# Patient Record
Sex: MALE | Race: WHITE | Employment: FULL TIME | URBAN - METROPOLITAN AREA
[De-identification: names, ages, dates, MRNs, and addresses within clinical notes are randomized per-mention and may not be internally consistent; named-entity substitution may affect disease eponyms.]

---

## 2020-11-16 ENCOUNTER — VIRTUAL VISIT (OUTPATIENT)
Dept: FAMILY MEDICINE | Facility: OTHER | Age: 50
End: 2020-11-16

## 2020-11-16 ENCOUNTER — AMBULATORY - HEALTHEAST (OUTPATIENT)
Dept: FAMILY MEDICINE | Facility: CLINIC | Age: 50
End: 2020-11-16

## 2020-11-16 DIAGNOSIS — Z20.822 SUSPECTED 2019 NOVEL CORONAVIRUS INFECTION: ICD-10-CM

## 2020-11-16 NOTE — PROGRESS NOTES
"Date: 2020 11:01:13  Clinician: Pasquale Vale  Clinician NPI: [id number]  Patient: Donavon Talavera  Patient : 1970  Patient Address: [address] [place] [address]  Patient Phone: [phone]  Visit Protocol: URI  Patient Summary:  Donavon is a 49 year old ( : 1970 ) male who initiated a [facility] Visit for COVID-19 (Coronavirus) evaluation and screening. When asked the question \"Please sign me up to receive news, health information and promotions. \", Donavon responded \"No\".    Donavon states his symptoms started gradually 7-9 days ago.   His symptoms consist of facial pain or pressure, ear pain, and a headache.   Symptom details     Facial pain or pressure: The facial pain or pressure does not feel worse when bending or leaning forward.     Headache: He states the headache is mild (1-3 on a 10 point pain scale).      Donavon denies having vomiting, rhinitis, myalgias, chills, malaise, sore throat, teeth pain, ageusia, diarrhea, wheezing, fever, cough, nasal congestion, nausea, and anosmia. He also denies taking antibiotic medication in the past month, having recent facial or sinus surgery in the past 60 days, and double sickening (worsening symptoms after initial improvement). He is not experiencing dyspnea.    Pertinent COVID-19 (Coronavirus) information  Donavon does not work or volunteer as healthcare worker or a . In the past 14 days, Donavon has not worked or volunteered at a healthcare facility or group living setting.   In the past 14 days, he also has not lived in a congregate living setting.   Donavon has had a close contact with a laboratory-confirmed COVID-19 patient within 14 days of symptom onset. He was not exposed at his work. Date Donavon was exposed to the laboratory-confirmed COVID-19 patient: 2020   Additional information about contact with COVID-19 (Coronavirus) patient as reported by the patient (free text): Just found out today moises has covid    Since " December 2019, Donavon has been tested for COVID-19 and has not had upper respiratory infection or influenza-like illness.      Result of COVID-19 test: Negative     Date of his COVID-19 test: 11/05/2020      Pertinent medical history  Donavon does not need a return to work/school note.   Weight: 218 lbs   Donavon does not smoke or use smokeless tobacco.   Weight: 218 lbs    MEDICATIONS: lisinopril oral, ALLERGIES: NKDA  Clinician Response:  Dear Donavon,   Your symptoms show that you may have coronavirus (COVID-19). This illness can cause fever, cough and trouble breathing. Many people get a mild case and get better on their own. Some people can get very sick.  What should I do?  We would like to test you for this virus.   1. Please call [phone] to schedule your visit. Explain that you were referred by [facility] to have a COVID-19 test. Be ready to share your [facility] visit ID number.  * If you need to schedule in [facility] please call [phone] or for [place] employees please call [phone].  * If you need to schedule in the [place] area please call [phone]. [place] employees call [phone].  The following will serve as your written order for this COVID Test, ordered by me, for the indication of suspected COVID [Z20.828]: The test will be ordered in [facility], our electronic health record, after you are scheduled. It will show as ordered and authorized by Trav Cramer MD.  Order: COVID-19 (Coronavirus) PCR for SYMPTOMATIC testing from [facility].   2. When it's time for your COVID test:  Stay at least 6 feet away from others. (If someone will drive you to your test, stay in the backseat, as far away from the  as you can.)   Cover your mouth and nose with a mask, tissue or washcloth.  Go straight to the testing site. Don't make any stops on the way there or back.      3.Starting now: Stay home and away from others (self-isolate) until:   You've had no fever---and no medicine that reduces fever---for one  "full day (24 hours). And...   Your other symptoms have gotten better. For example, your cough or breathing has improved. And...   At least 10 days have passed since your symptoms started.       During this time, don't leave the house except for testing or medical care.   Stay in your own room, even for meals. Use your own bathroom if you can.   Stay away from others in your home. No hugging, kissing or shaking hands. No visitors.  Don't go to work, school or anywhere else.    Clean \"high touch\" surfaces often (doorknobs, counters, handles, etc.). Use a household cleaning spray or wipes. You'll find a full list of  on the EPA website: www.epa.gov/pesticide-registration/list-n-disinfectants-use-against-sars-cov-2.   Cover your mouth and nose with a mask, tissue or washcloth to avoid spreading germs.  Wash your hands and face often. Use soap and water.  Caregivers in these groups are at risk for severe illness due to COVID-19:  o People 65 years and older  o People who live in a nursing home or long-term care facility  o People with chronic disease (lung, heart, cancer, diabetes, kidney, liver, immunologic)  o People who have a weakened immune system, including those who:   Are in cancer treatment  Take medicine that weakens the immune system, such as corticosteroids  Had a bone marrow or organ transplant  Have an immune deficiency  Have poorly controlled HIV or AIDS  Are obese (body mass index of 40 or higher)  Smoke regularly   o Caregivers should wear gloves while washing dishes, handling laundry and cleaning bedrooms and bathrooms.  o Use caution when washing and drying laundry: Don't shake dirty laundry, and use the warmest water setting that you can.  o For more tips, go to www.cdc.gov/coronavirus/2019-ncov/downloads/10Things.pdf.       How can I take care of myself?   Get lots of rest. Drink extra fluids (unless a doctor has told you not to).   Take Tylenol (acetaminophen) for fever or pain. If you have " liver or kidney problems, ask your family doctor if it's okay to take Tylenol.   Adults can take either:    650 mg (two 325 mg pills) every 4 to 6 hours, or...   1,000 mg (two 500 mg pills) every 8 hours as needed.    Note: Don't take more than 3,000 mg in one day. Acetaminophen is found in many medicines (both prescribed and over-the-counter medicines). Read all labels to be sure you don't take too much.   For children, check the Tylenol bottle for the right dose. The dose is based on the child's age or weight.    If you have other health problems (like cancer, heart failure, an organ transplant or severe kidney disease): Call your specialty clinic if you don't feel better in the next 2 days.       Know when to call 911. Emergency warning signs include:    Trouble breathing or shortness of breath Pain or pressure in the chest that doesn't go away Feeling confused like you haven't felt before, or not being able to wake up Bluish-colored lips or face.  Where can I get more information?   [facility] -- About COVID-19: [email or web address]   CDC -- What to Do If You're Sick: www.cdc.gov/coronavirus/2019-ncov/about/steps-when-sick.html   CDC -- Ending Home Isolation: www.cdc.gov/coronavirus/2019-ncov/hcp/disposition-in-home-patients.html   CDC -- Caring for Someone: www.cdc.gov/coronavirus/2019-ncov/if-you-are-sick/care-for-someone.html   [facility] -- Interim Guidance for Hospital Discharge to Home: www.health.[facility].[place].us/diseases/coronavirus/hcp/hospdischarge.pdf   [facility] clinical trials (COVID-19 research studies): [email or web address]    Below are the COVID-19 hotlines at the [place] Department of Health ([facility]). Interpreters are available.    For health questions: Call 371-444-5708 or 1-110.807.3389 (7 a.m. to 7 p.m.) For questions about schools and childcare: Call 978-440-2818 or 1-296.461.8027 (7 a.m. to 7 p.m.)    Diagnosis: Contact with and (suspected)  exposure to other viral communicable diseases  Diagnosis ICD: Z20.828

## 2020-11-17 ENCOUNTER — AMBULATORY - HEALTHEAST (OUTPATIENT)
Dept: FAMILY MEDICINE | Facility: CLINIC | Age: 50
End: 2020-11-17

## 2020-11-17 DIAGNOSIS — Z20.822 SUSPECTED 2019 NOVEL CORONAVIRUS INFECTION: ICD-10-CM

## 2020-11-19 ENCOUNTER — COMMUNICATION - HEALTHEAST (OUTPATIENT)
Dept: SCHEDULING | Facility: CLINIC | Age: 50
End: 2020-11-19

## 2021-02-08 ENCOUNTER — THERAPY VISIT (OUTPATIENT)
Dept: PHYSICAL THERAPY | Facility: CLINIC | Age: 51
End: 2021-02-08
Payer: COMMERCIAL

## 2021-02-08 DIAGNOSIS — M54.6 RIGHT-SIDED THORACIC BACK PAIN: Primary | ICD-10-CM

## 2021-02-08 PROCEDURE — 97161 PT EVAL LOW COMPLEX 20 MIN: CPT | Mod: GP | Performed by: PHYSICAL THERAPIST

## 2021-02-08 PROCEDURE — 97110 THERAPEUTIC EXERCISES: CPT | Mod: GP | Performed by: PHYSICAL THERAPIST

## 2021-02-08 PROCEDURE — 97530 THERAPEUTIC ACTIVITIES: CPT | Mod: GP | Performed by: PHYSICAL THERAPIST

## 2021-02-08 NOTE — PROGRESS NOTES
[facility] Physical Therapy Initial Evaluation  2/8/2021     Precautions/Restrictions/MD instructions: eval & treat    Therapist Assessment: Donavon Talavera is a 50 year old male patient presenting to Physical Therapy with R scapular pain. Patient demonstrates decreased ROM, decreased joint mobility, and poor scapular motor control. Signs and symptoms are consistent with scapular pain with thoracic movement deficits. These impairments limit their ability to sit or stand still for extended periods. Skilled PT services are necessary in order to reduce impairments and improve independent function.    Subjective History    Injury/Condition Details:  Presenting Complaint Right shoulder blade pain   Onset Timing/Date 1.5 months, (Doctor's referral 2/5/21)   Mechanism Unknown; has had this pain before in the past, but it went away after a couple of days     Symptom Behavior Details    Primary Symptoms Constant symptoms; worsen with activity, pain (Location: R scapula, Quality: pinching/pulling)    Denies locking, catching, giving way, or instability. Denies numbness, tingling, changes in sensation. Reports occasional pain shooting down R tricep and into axilla   Worst Pain 9-10/10 (constant until he started steroids)   Symptom Provocators Sitting to drive   Best Pain 3/10    Symptom Relievers Laying flat on his back   Time of day dependent? No   Recent symptom change? symptoms improving     Prior Testing/Intervention for current condition:  Prior Tests  MRI 1/27/20  IMPRESSION:    1.  Small right-sided disc herniation at T2-T3. Central canal and neural foramen appear adequate, but the herniation could potentially affect the exiting nerve given the history of right-sided thoracic back pain. Correlate for any right T2 symptoms.    2.  Mild to moderate disc degeneration in the midthoracic spine without high-grade stenosis.    3.  No thoracic spine edema. Normal thoracic spinal cord.    Prior Treatment  Medication; steroid (took the edge off) and NSAIDs, chiropractor (helped in the past)     Lifestyle & General Medical History:  Employment  - 6 hr shifts, walking, stair climbing, standing, usually 30-45 min break   Usual physical activities  (within past year) Snowblowing/shoveling, walking the dogs min 1mile   Orthopaedic History  Lipoma removal   Medication  High blood pressure, statin   Notable medical history See Epic Chart   Patient goals Get rid of pain   Patient Reported Health good     Red Flags: (Bold when present) - reviewed the following and denies  Vertebrobasilar Artery   Symptom   Dysphagia Drop Attack   Dysarthria Dizziness   Diplopia Paresthesia of lips   Spinal Cord  Symptom   Bi/Quadriparesthesia Ataxia   Hemiparesthesia Urinary incontinence   Bi/Quadriparesis Fecal incontinence     Cranial Nerves - bold when abnormality is present  Cranial nerve   II-Scotoma VIII-Loss of Balance   III-Diplopia VIII-Hypoacousia   V-Facial paresthesia IX-Dysarthria   VII-Altered Taste IX-Dysphagia    X-Nausea       PHYSICAL THERAPY CERVICAL EXAMINATION    STATIC POSTURE  Forward head on neck, Increased thoracic kyphosis and Rounded shoulders    Cervical Spine ROM Screen   RIGHT LEFT   Cervical Spine ROM   Flexion Full - painfree   Extension Full - painfree   Rotation Min limit - painful Min limit - painfree   Sidebend Min limit - painfree Min limit - painfree   Seated Thoracic Spine ROM   Rotation Min limit - some pain Min limit - painfree   Flexion Full - decreases pain    Extension Min limit - decreases pain      Passive Joint Mobility Screen: hypomobile T3-T6    Tender to palpation at the following structures: SP C7, T2-T6  NOT tender to palpation at the following structures: R periscapular region       Upper Extremity Neural System Examination  Myotomes Strength (MMT)    Left Pain Right Pain   Resisted ABD (C5) 5/5 - 5/5 -   Resisted Elbow Flexion (C6)                 Wrist Extension 5/5 -  - 5/5  -  -   Resisted Elbow Extension (C7)                 Wrist Flexion 5/5   -   5/5   -     Resisted Thumb Extension (C8) 5/5 - 5/5 -   Resisted Intrinsics (T1) 5/5 - 5/5 -   Sensory/Reflex Tests: SILT and symmetrical for bilateral UE's.    Scapular Muscle Strength  Muscle MMT L MMT R   Lats 4+/5 4+/5   Mid Traps 4+/5 4/5   Rhomboids 4+/5 4/5   Lower Traps 4/5 4/5     When asked to engage scapulae in prone, pt activated lats - was unable to engage scapular adductors/depressors without verbal and tactile cueing    Upper Extremity Flexibility Screen:   Right Left   Pec Major - -   Pec Minor - -   Upper Trap + +   Levator Scapula - -   Scalenes - -   SubOccipital - -   Erector Spinae  + +   - = normal  + = mild tightness  ++ = moderate tightness  +++ = significant tightness      ASSESSMENT/PLAN:  Pt's pain increased with forced scapular abduction (arms across chest) and decreased with active thoracic flexion and extension. I was unable to elicit pt's complaint of shooting pain in the R tricep and R axilla. Pt emphasizes that location and severity of pain changes day to day and is seemingly random.    The patient is a 50 year old male with chief complaint of mid-back pain.    The patient has the following significant findings with corresponding treatment plan.  Diagnosis 1:  Acute on chronic mid-back pain    Pain -  hot/cold therapy, US, electric stimulation, manual therapy, STS, self management, education, directional preference exercise and home program  Decreased ROM/flexibility - manual therapy, therapeutic exercise and home program  Decreased joint mobility - manual therapy, therapeutic exercise and home program  Decreased strength - therapeutic exercise, therapeutic activities and home program  Impaired balance - neuro re-education, therapeutic activities and home program  Decreased proprioception - neuro re-education and therapeutic activities  Impaired gait - gait training and assistive devices  Impaired muscle  performance - neuro re-education and home program  Decreased function - therapeutic activities and home program  Impaired posture - neuro re-education, therapeutic activities and home program      Therapy Evaluation Codes:   1) History comprised of:   Personal factors that impact the plan of care:      None.    Comorbidity factors that impact the plan of care are:      High blood pressure.     Medications impacting care: High blood pressure.  2) Examination of Body Systems comprised of:   Body structures and functions that impact the plan of care:      Thoracic Spine.   Activity limitations that impact the plan of care are:      Sitting, Standing and Working.  3) Clinical presentation characteristics are:   Evolving/Changing.  4) Decision-Making    Moderate complexity using standardized patient assessment instrument and/or measureable assessment of functional outcome.  Cumulative Therapy Evaluation is: Low complexity.    Previous and current functional limitations:  (See Goal Flow Sheet for this information)    Short term and Long term goals: (See Goal Flow Sheet for this information)     Communication ability:  Patient appears to be able to clearly communicate and understand verbal and written communication and follow directions correctly.  Treatment Explanation - The following has been discussed with the patient:   RX ordered/plan of care  Anticipated outcomes  Possible risks and side effects  This patient would benefit from PT intervention to resume normal activities.   Rehab potential is good.    Frequency:  1 X week, once daily  Duration:  for 6 weeks  Discharge Plan: Achieve all LTGs, be independent in home treatment program, and reach maximal therapeutic benefit.    Please refer to the daily flowsheet for treatment today, total treatment time and time spent performing 1:1 timed codes.

## 2021-02-18 ENCOUNTER — THERAPY VISIT (OUTPATIENT)
Dept: PHYSICAL THERAPY | Facility: CLINIC | Age: 51
End: 2021-02-18
Payer: COMMERCIAL

## 2021-02-18 DIAGNOSIS — M54.6 RIGHT-SIDED THORACIC BACK PAIN: ICD-10-CM

## 2021-02-18 PROCEDURE — 97110 THERAPEUTIC EXERCISES: CPT | Mod: GP | Performed by: PHYSICAL THERAPIST

## 2021-02-18 PROCEDURE — 97530 THERAPEUTIC ACTIVITIES: CPT | Mod: GP | Performed by: PHYSICAL THERAPIST

## 2021-02-18 PROCEDURE — 97140 MANUAL THERAPY 1/> REGIONS: CPT | Mod: GP | Performed by: PHYSICAL THERAPIST

## 2021-02-25 ENCOUNTER — THERAPY VISIT (OUTPATIENT)
Dept: PHYSICAL THERAPY | Facility: CLINIC | Age: 51
End: 2021-02-25
Payer: COMMERCIAL

## 2021-02-25 DIAGNOSIS — M54.6 RIGHT-SIDED THORACIC BACK PAIN: ICD-10-CM

## 2021-02-25 PROCEDURE — 97110 THERAPEUTIC EXERCISES: CPT | Mod: GP | Performed by: PHYSICAL THERAPIST

## 2021-02-25 PROCEDURE — 97140 MANUAL THERAPY 1/> REGIONS: CPT | Mod: GP | Performed by: PHYSICAL THERAPIST

## 2021-03-04 ENCOUNTER — THERAPY VISIT (OUTPATIENT)
Dept: PHYSICAL THERAPY | Facility: CLINIC | Age: 51
End: 2021-03-04
Payer: COMMERCIAL

## 2021-03-04 DIAGNOSIS — M54.6 RIGHT-SIDED THORACIC BACK PAIN: ICD-10-CM

## 2021-03-04 PROCEDURE — 97110 THERAPEUTIC EXERCISES: CPT | Mod: GP | Performed by: PHYSICAL THERAPIST

## 2021-03-04 PROCEDURE — 97140 MANUAL THERAPY 1/> REGIONS: CPT | Mod: GP | Performed by: PHYSICAL THERAPIST

## 2021-03-11 ENCOUNTER — THERAPY VISIT (OUTPATIENT)
Dept: PHYSICAL THERAPY | Facility: CLINIC | Age: 51
End: 2021-03-11
Payer: COMMERCIAL

## 2021-03-11 DIAGNOSIS — M54.6 RIGHT-SIDED THORACIC BACK PAIN: ICD-10-CM

## 2021-03-11 PROCEDURE — 97140 MANUAL THERAPY 1/> REGIONS: CPT | Mod: GP | Performed by: PHYSICAL THERAPIST

## 2021-03-11 PROCEDURE — 97110 THERAPEUTIC EXERCISES: CPT | Mod: GP | Performed by: PHYSICAL THERAPIST

## 2021-03-18 ENCOUNTER — THERAPY VISIT (OUTPATIENT)
Dept: PHYSICAL THERAPY | Facility: CLINIC | Age: 51
End: 2021-03-18
Payer: COMMERCIAL

## 2021-03-18 DIAGNOSIS — M54.6 RIGHT-SIDED THORACIC BACK PAIN: ICD-10-CM

## 2021-03-18 PROCEDURE — 97112 NEUROMUSCULAR REEDUCATION: CPT | Mod: GP | Performed by: PHYSICAL THERAPIST

## 2021-03-18 PROCEDURE — 97140 MANUAL THERAPY 1/> REGIONS: CPT | Mod: GP | Performed by: PHYSICAL THERAPIST

## 2021-03-18 NOTE — LETTER
"[facility] [place]  [address]  [address]  [place] [address]  [phone]    2021    Re: Donavon Talavera   :   1970  MRN:  [id number]   REFERRING PHYSICIAN:   Juwan LARES [facility] [place]    Date of Initial Evaluation:  2021  Visits:  Rxs Used: 6  Reason for Referral:  Right-sided thoracic back pain    EVALUATION SUMMARY    PROGRESS  REPORT    Progress reporting period is from 2021 to 3/18/2021.       SUBJECTIVE  Subjective: Over the last 2-3 days things have been pretty bad. Pain between 2-4/10. He has worked every day since last Friday - the majority of work has been repetitive motions overhead. 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 \"the pain always starts at work.\"     Current Pain level: 1/10.     Initial Pain level: 4/10.   Changes in function:  Yes (See Goal flowsheet attached for changes in current functional level)  Adverse reaction to treatment or activity: activity - repetitive OH use of arm at work triggers symptoms due to poor scapular mechanics    OBJECTIVE  Objective: poor scapular mechanics with OH lifting task - compensates with shoulder shrug. 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)     ASSESSMENT/PLAN  Updated problem list and treatment plan: Diagnosis 1:  Acute on chronic mid back pain  Pain -  hot/cold therapy, US, electric stimulation, manual therapy, splint/taping/bracing/orthotics, self management, education and home program  Decreased ROM/flexibility - manual therapy, therapeutic exercise and home program  Decreased strength - therapeutic exercise, therapeutic activities and home program  Decreased proprioception - neuro re-education, therapeutic activities and home program  Impaired muscle performance - neuro re-education and home program  Decreased " function - therapeutic activities and home program  Impaired posture - neuro re-education and home program  STG/LTGs have been met or progress has been made towards goals:  Yes (See Goal flow sheet completed today.)  Assessment of Progress: The patient's condition has potential to improve.  Self Management Plans:  Patient is independent in a home treatment program.  Patient  has been instructed in self management of symptoms.  I have re-evaluated this patient and find that the nature, scope, duration and intensity of the therapy is appropriate for the medical condition of the patient.  Donavon continues to require the following intervention to meet STG and LTG's:  PT    Recommendations:  This patient would benefit from continued therapy.     Frequency:  1 X week, once daily  Duration:  for 2 weeks tapering to 2 X a month over 2 months              Thank you for your referral.    INQUIRIES  Therapist: Britt Huang PT   [address]  [facility] [address]  Phone: [phone]  Fax: [phone]

## 2021-03-18 NOTE — PROGRESS NOTES
"PROGRESS  REPORT    Progress reporting period is from 2/8/2021 to 3/18/2021.       SUBJECTIVE  Subjective: Over the last 2-3 days things have been pretty bad. Pain between 2-4/10. He has worked every day since last Friday - the majority of work has been repetitive motions overhead. 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 \"the pain always starts at work.\"     Current Pain level: 1/10.     Initial Pain level: 4/10.   Changes in function:  Yes (See Goal flowsheet attached for changes in current functional level)  Adverse reaction to treatment or activity: activity - repetitive OH use of arm at work triggers symptoms due to poor scapular mechanics    OBJECTIVE  Objective: poor scapular mechanics with OH lifting task - compensates with shoulder shrug. 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)     ASSESSMENT/PLAN  Updated problem list and treatment plan: Diagnosis 1:  Acute on chronic mid back pain  Pain -  hot/cold therapy, US, electric stimulation, manual therapy, splint/taping/bracing/orthotics, self management, education and home program  Decreased ROM/flexibility - manual therapy, therapeutic exercise and home program  Decreased strength - therapeutic exercise, therapeutic activities and home program  Decreased proprioception - neuro re-education, therapeutic activities and home program  Impaired muscle performance - neuro re-education and home program  Decreased function - therapeutic activities and home program  Impaired posture - neuro re-education and home program  STG/LTGs have been met or progress has been made towards goals:  Yes (See Goal flow sheet completed today.)  Assessment of Progress: The patient's condition has potential to improve.  Self Management Plans:  Patient is independent in a home treatment program.  Patient  has been instructed in self management of symptoms.  I have " re-evaluated this patient and find that the nature, scope, duration and intensity of the therapy is appropriate for the medical condition of the patient.  Donavon continues to require the following intervention to meet STG and LTG's:  PT    Recommendations:  This patient would benefit from continued therapy.     Frequency:  1 X week, once daily  Duration:  for 2 weeks tapering to 2 X a month over 2 months        Please refer to the daily flowsheet for treatment today, total treatment time and time spent performing 1:1 timed codes.

## 2021-06-03 ENCOUNTER — THERAPY VISIT (OUTPATIENT)
Dept: PHYSICAL THERAPY | Facility: CLINIC | Age: 51
End: 2021-06-03
Payer: COMMERCIAL

## 2021-06-03 DIAGNOSIS — M25.512 LEFT SHOULDER PAIN, UNSPECIFIED CHRONICITY: Primary | ICD-10-CM

## 2021-06-03 DIAGNOSIS — Z86.018 S/P EXCISION OF LIPOMA: ICD-10-CM

## 2021-06-03 DIAGNOSIS — M54.2 NECK PAIN: ICD-10-CM

## 2021-06-03 DIAGNOSIS — Z98.890 S/P EXCISION OF LIPOMA: ICD-10-CM

## 2021-06-03 PROCEDURE — 97161 PT EVAL LOW COMPLEX 20 MIN: CPT | Mod: GP | Performed by: PHYSICAL THERAPIST

## 2021-06-03 PROCEDURE — 97110 THERAPEUTIC EXERCISES: CPT | Mod: GP | Performed by: PHYSICAL THERAPIST

## 2021-06-03 NOTE — LETTER
[facility] [place]  [address] [facility]  [address]  [place] [address]  [phone]    2021    Re: Donavon Talavera   :   1970  MRN:  [id number]   REFERRING PHYSICIAN:   Alyssa LARES [facility] [place]    Date of Initial Evaluation:  6/3/2021  Visits:  Rxs Used: 1  Reason for Referral:     Left shoulder pain, unspecified chronicity  Neck pain  S/P excision of lipoma    EVALUATION SUMMARY     PHYSICAL THERAPY INITIAL EVALUATION    Precautions/Restrictions/MD instructions:      Therapist Assessment: Donavon Talavera is a 50 year old right hand dominant male who presents to Physical Therapy following surgical excision of a large left sided neck mass.  Patient demonstrates mild loss of cervical AROM, decreased left shoulder AROM, significant scapular dyskinesis, and left shoulder weakness.  Signs and symptoms are consistent with post-op status.   These impairments limit his ability to perform ADLs with left UE.  Skilled PT services are necessary in order to reduce impairments and improve independent function.     SUBJECTIVE:  Chief Complaint: decreased mobility and strength in left shoulder   MD referral date:  21     DOS: 21 had very large mass excised from the left side of his neck. Twenty years ago, had a lipoma removed from this same area.      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  Spinal accessory nerve running through mass--dissected it out but nerve was without twitches at end of case  Mechanism of Injury: none  New/Recurrent/Chronic:  recurrent  Pain Location: left side of neck, collar bone, and lateral border of scapula;  described as tightness and rated as 1/10  Associated Symptoms: motions loss, weakness; denies any UE paresthesias  Exacerbated by:  Unknown; has not been  using left arm much               Alleviated by: activity modification  Time of day: no effect  Progression of symptoms since onset:  Gradually improving  Previous Treatment:  none  General Health as reported by patient: Good  Pertinent Medical History: high blood pressure; cervical spine herniated disc  Surgical History: lipoma excision 30 yrs ago  Imaging: MRI, CT scan, X-rays, US  Medications:  High blood pressure medication, cholesterol medication  Occupation:   Primary Job Tasks: operating a machine, pushing/pulling, prolonged standing    Restrictions: currently not working due to present condition; plans to return 6/0 without restrictions  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  Red Flags:  patient denies any fever, chills, or night sweats; recent infection    Current Functional Status: impaired  Previous Functional Status:  fully functional  Leisure Activities: none  Patient's Goal(s):  To regain full motion and strength in left shoulder            Objective:  System  SHOULDER EVALUATION  POSTURE: downwardly rotated L scap with anterior tilt; scapular winging   CERVICAL SCREEN: Flex WNL, Ext WNL, Rotation R mild loss with end range tightness, Rotation L WNL, SB mild loss bilat with end range tightness  SPURLING'S: NT  RANGE OF MOTION: (* denotes pain)   AROM L AROM R PROM L PROM R   FLEXION 115* 165 WNL    ABDUCTION 118* 173 WNL*    IR/EXT T7 with severe scapular winging T8     IR   WNL    ER WNL  WNL    HORIZ ADD       STRENGTH:  (* denotes pain)   LEFT RIGHT   FLEXION  3-/5      5/5   ABDUCTION 3-/5 5/5   IR 5/5 5/5   ER 5/5 5/5   SUPRASPINATUS     ELBOW FLEX 5/5 5/5   ELBOW EXT 5/5 5/5   Scapulohumeral Rhythm: Abnormal with significant scapular dyskinesis     Other:  Active shoulder elevation, depression, protraction, and retraction WNL  Mobility Testing: NT         Palpation:   moderate swelling and decreased sensation in left supraclavicular fossa; incisional scar healing and  unremarkable for any signs of infection or drainage; no tenderness to palpation reported over left UT, levator, scalenes, SCM, cervical paraspinals or posterior cuff musculature    Assessment/Plan:    Patient is a 50 year old male with left shoulder complaints.    Patient has the following significant findings with corresponding treatment plan.                Diagnosis 1:  Left Shoulder Pain    Pain -  self management, education and home program  Decreased ROM/flexibility - therapeutic exercise  Decreased strength - therapeutic exercise  Impaired muscle performance - neuro re-education  Decreased function - therapeutic activities and home program  Impaired posture - neuro re-education  Cumulative Therapy Evaluation is: Low complexity.  Previous and current functional limitations:  (See Goal Flow Sheet for this information)    Short term and Long term goals: (See Goal Flow Sheet for this information)   Communication ability:  Patient appears to be able to clearly communicate and understand verbal and written communication and follow directions correctly.  Treatment Explanation - The following has been discussed with the patient:   RX ordered/plan of care  Anticipated outcomes  Possible risks and side effects  This patient would benefit from PT intervention to resume normal activities.   Rehab potential is good.  Frequency:  1 X week, once daily  Duration:  for 6 weeks  Discharge Plan:  Achieve all LTG.  Independent in home treatment program.  Reach maximal therapeutic benefit.          Thank you for your referral.    INQUIRIES  Therapist: Ruthy Motley PT   [address] [address]  Phone: [phone]  Fax: [phone]

## 2021-06-03 NOTE — PROGRESS NOTES
PHYSICAL THERAPY INITIAL EVALUATION    Precautions/Restrictions/MD instructions:  Evaluate & Treat - S/P excision of recurrent neck mass    Therapist Assessment: Donavon Talavera is a 50 year old right hand dominant male who presents to Physical Therapy following surgical excision of a large left sided neck mass.  Patient demonstrates mild loss of cervical AROM, decreased left shoulder AROM, significant scapular dyskinesis, and left shoulder weakness.  Signs and symptoms are consistent with post-op status.   These impairments limit his ability to perform ADLs with left UE.  Skilled PT services are necessary in order to reduce impairments and improve independent function.     SUBJECTIVE:  Chief Complaint: decreased mobility and strength in left shoulder   MD referral date:  5/28/21     DOS: 5/24/21 had very large mass excised from the left side of his neck. Twenty years ago, had a lipoma removed from this same area.      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  Spinal accessory nerve running through mass--dissected it out but nerve was without twitches at end of case    Mechanism of Injury: none  New/Recurrent/Chronic:  recurrent  Pain Location: left side of neck, collar bone, and lateral border of scapula;  described as tightness and rated as 1/10  Associated Symptoms: motions loss, weakness; denies any UE paresthesias  Exacerbated by:  Unknown; has not been using left arm much               Alleviated by: activity modification  Time of day: no effect  Progression of symptoms since onset:  Gradually improving  Previous Treatment:  none    General Health as reported by patient: Good  Pertinent Medical History: high blood pressure; cervical spine herniated disc  Surgical History: lipoma excision 30 yrs ago  Imaging: MRI, CT scan, X-rays, US  Medications:  High blood pressure  medication, cholesterol medication    Occupation:   Primary Job Tasks: operating a machine, pushing/pulling, prolonged standing    Restrictions: currently not working due to present condition; plans to return 6/0 without restrictions  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  Red Flags:  patient denies any fever, chills, or night sweats; recent infection    Current Functional Status: impaired  Previous Functional Status:  fully functional  Leisure Activities: none    Patient's Goal(s):  To regain full motion and strength in left shoulder                              Objective:  System  SHOULDER EVALUATION    POSTURE: downwardly rotated L scap with anterior tilt; scapular winging     CERVICAL SCREEN: Flex WNL, Ext WNL, Rotation R mild loss with end range tightness, Rotation L WNL, SB mild loss bilat with end range tightness  SPURLING'S: NT    RANGE OF MOTION: (* denotes pain)   AROM L AROM R PROM L PROM R   FLEXION 115* 165 WNL    ABDUCTION 118* 173 WNL*    IR/EXT T7 with severe scapular winging T8     IR   WNL    ER WNL  WNL    HORIZ ADD         STRENGTH:  (* denotes pain)   LEFT RIGHT   FLEXION  3-/5      5/5   ABDUCTION 3-/5 5/5   IR 5/5 5/5   ER 5/5 5/5   SUPRASPINATUS     ELBOW FLEX 5/5 5/5   ELBOW EXT 5/5 5/5     Scapulohumeral Rhythm: Abnormal with significant scapular dyskinesis     Other:  Active shoulder elevation, depression, protraction, and retraction WNL  Mobility Testing: NT         Palpation:   moderate swelling and decreased sensation in left supraclavicular fossa; incisional scar healing and unremarkable for any signs of infection or drainage; no tenderness to palpation reported over left UT, levator, scalenes, SCM, cervical paraspinals or posterior cuff musculature    Physical Exam    General     ROS    Assessment/Plan:    Patient is a 50 year old male with left shoulder complaints.    Patient has the following significant findings with corresponding treatment plan.                 Diagnosis 1:  Left Shoulder Pain    Pain -  self management, education and home program  Decreased ROM/flexibility - therapeutic exercise  Decreased strength - therapeutic exercise  Impaired muscle performance - neuro re-education  Decreased function - therapeutic activities and home program  Impaired posture - neuro re-education        Cumulative Therapy Evaluation is: Low complexity.    Previous and current functional limitations:  (See Goal Flow Sheet for this information)    Short term and Long term goals: (See Goal Flow Sheet for this information)     Communication ability:  Patient appears to be able to clearly communicate and understand verbal and written communication and follow directions correctly.  Treatment Explanation - The following has been discussed with the patient:   RX ordered/plan of care  Anticipated outcomes  Possible risks and side effects  This patient would benefit from PT intervention to resume normal activities.   Rehab potential is good.    Frequency:  1 X week, once daily  Duration:  for 6 weeks  Discharge Plan:  Achieve all LTG.  Independent in home treatment program.  Reach maximal therapeutic benefit.    Please refer to the daily flowsheet for treatment today, total treatment time and time spent performing 1:1 timed codes.

## 2021-06-08 ENCOUNTER — THERAPY VISIT (OUTPATIENT)
Dept: PHYSICAL THERAPY | Facility: CLINIC | Age: 51
End: 2021-06-08
Payer: COMMERCIAL

## 2021-06-08 DIAGNOSIS — Z98.890 S/P EXCISION OF LIPOMA: ICD-10-CM

## 2021-06-08 DIAGNOSIS — M54.2 NECK PAIN: ICD-10-CM

## 2021-06-08 DIAGNOSIS — M25.512 LEFT SHOULDER PAIN, UNSPECIFIED CHRONICITY: ICD-10-CM

## 2021-06-08 DIAGNOSIS — Z86.018 S/P EXCISION OF LIPOMA: ICD-10-CM

## 2021-06-08 PROCEDURE — 97140 MANUAL THERAPY 1/> REGIONS: CPT | Mod: GP | Performed by: PHYSICAL THERAPIST

## 2021-06-08 PROCEDURE — 97112 NEUROMUSCULAR REEDUCATION: CPT | Mod: GP | Performed by: PHYSICAL THERAPIST

## 2021-06-08 PROCEDURE — 97110 THERAPEUTIC EXERCISES: CPT | Mod: GP | Performed by: PHYSICAL THERAPIST

## 2021-06-17 ENCOUNTER — THERAPY VISIT (OUTPATIENT)
Dept: PHYSICAL THERAPY | Facility: CLINIC | Age: 51
End: 2021-06-17
Payer: COMMERCIAL

## 2021-06-17 DIAGNOSIS — M25.512 LEFT SHOULDER PAIN, UNSPECIFIED CHRONICITY: ICD-10-CM

## 2021-06-17 DIAGNOSIS — M54.2 NECK PAIN: ICD-10-CM

## 2021-06-17 DIAGNOSIS — Z98.890 S/P EXCISION OF LIPOMA: ICD-10-CM

## 2021-06-17 DIAGNOSIS — Z86.018 S/P EXCISION OF LIPOMA: ICD-10-CM

## 2021-06-17 PROCEDURE — 97140 MANUAL THERAPY 1/> REGIONS: CPT | Mod: GP

## 2021-06-17 PROCEDURE — 97110 THERAPEUTIC EXERCISES: CPT | Mod: GP

## 2021-06-22 ENCOUNTER — THERAPY VISIT (OUTPATIENT)
Dept: PHYSICAL THERAPY | Facility: CLINIC | Age: 51
End: 2021-06-22
Payer: COMMERCIAL

## 2021-06-22 DIAGNOSIS — M54.2 NECK PAIN: ICD-10-CM

## 2021-06-22 DIAGNOSIS — Z98.890 S/P EXCISION OF LIPOMA: ICD-10-CM

## 2021-06-22 DIAGNOSIS — Z86.018 S/P EXCISION OF LIPOMA: ICD-10-CM

## 2021-06-22 DIAGNOSIS — M25.512 LEFT SHOULDER PAIN, UNSPECIFIED CHRONICITY: ICD-10-CM

## 2021-06-22 PROCEDURE — 97110 THERAPEUTIC EXERCISES: CPT | Mod: GP | Performed by: PHYSICAL THERAPIST

## 2021-06-22 PROCEDURE — 97112 NEUROMUSCULAR REEDUCATION: CPT | Mod: GP | Performed by: PHYSICAL THERAPIST

## 2021-06-22 PROCEDURE — 97140 MANUAL THERAPY 1/> REGIONS: CPT | Mod: GP | Performed by: PHYSICAL THERAPIST

## 2021-06-22 NOTE — PROGRESS NOTES
Subjective:  HPI  Physical Exam                    Objective:  System    Physical Exam    General     ROS    Assessment/Plan:    DISCHARGE REPORT    Progress reporting period is from 6/03/21 to 6/22/21.       SUBJECTIVE  Patient reports almost 100% improvement in his neck and left shoulder since the start of therapy. He is pleased with his recovery of shoulder motion and has returned to normal job duties and ADLs without any limitations.          Current pain level is 0/10  .     Previous pain level was  1/10  .   Changes in function:  Yes (See Goal flowsheet attached for changes in current functional level)  Adverse reaction to treatment or activity: None    OBJECTIVE  Cervical AROM: WNL and painfree  Left Shoulder AROM: WNL.  Ongoing static and dynamic scapular dyskinesis observed on left.  Left Shoulder Strength:  Flex 5/5, Abd 4+/5, ER 5/5, IR 5/5  Palpation: mild trigger points remain in left upper trap and levator with tenderness in these areas.  Incisional scar is healed with good scar mobility.  Swelling:  Very mild swelling remains in left supraclavicular fossa          ASSESSMENT/PLAN  STG/LTGs have been met or progress has been made towards goals:  Yes (See Goal flow sheet completed today.)  Assessment of Progress: The patient has met all of his long term goals.  Self Management Plans:  Patient has been instructed in a home exercise program and now feels he can manage things on his own.  PT intervention is no longer required to meet STG/LTG.    Recommendations:  This patient is ready to be discharged from therapy and continue his home exercise program. Patient has been instructed in how to appropriately progress with light weights and high reps for shoulder girdle strengthening.  Encouraged patient to call if he has any questions or concerns.      Please refer to the daily flowsheet for treatment today, total treatment time and time spent performing 1:1 timed codes.

## 2021-06-22 NOTE — LETTER
[facility] [place]  [address]  [address]  [place] [address]  [phone]    2021    Re: Donavon Talavera   :   1970  MRN:  [id number]   REFERRING PHYSICIAN:   Alyssa LARES [facility] [place]    Date of Initial Evaluation:  6/3/2021  Visits:  Rxs Used: 4  Reason for Referral:     Left shoulder pain, unspecified chronicity  Neck pain  S/P excision of lipoma    EVALUATION SUMMARY    Subjective:  HPI  Physical Exam                    Objective:  System    Physical Exam    General     ROS    Assessment/Plan:    DISCHARGE REPORT    Progress reporting period is from 21 to 21.       SUBJECTIVE  Patient reports almost 100% improvement in his neck and left shoulder since the start of therapy. He is pleased with his recovery of shoulder motion and has returned to normal job duties and ADLs without any limitations.          Current pain level is 0/10  .     Previous pain level was  1/10  .   Changes in function:  Yes (See Goal flowsheet attached for changes in current functional level)  Adverse reaction to treatment or activity: None      Re: Donavon Talavera   :   1970    OBJECTIVE  Cervical AROM: WNL and painfree  Left Shoulder AROM: WNL.  Ongoing static and dynamic scapular dyskinesis observed on left.  Left Shoulder Strength:  Flex 5/5, Abd 4+/5, ER 5/5, IR 5/5  Palpation: mild trigger points remain in left upper trap and levator with tenderness in these areas.  Incisional scar is healed with good scar mobility.  Swelling:  Very mild swelling remains in left supraclavicular fossa    ASSESSMENT/PLAN  STG/LTGs have been met or progress has been made towards goals:  Yes (See Goal flow sheet completed today.)  Assessment of Progress: The patient has met all of his long term goals.  Self Management Plans:  Patient has been instructed in a home exercise program and now feels he can manage things on his  own.  PT intervention is no longer required to meet STG/LTG.    Recommendations:  This patient is ready to be discharged from therapy and continue his home exercise program. Patient has been instructed in how to appropriately progress with light weights and high reps for shoulder girdle strengthening.  Encouraged patient to call if he has any questions or concerns.    Thank you for your referral.    INQUIRIES  Therapist: Ruthy Motley PT  [address]  [facility] [address]  Phone: [phone]  Fax: [phone]

## 2021-09-09 PROBLEM — M54.6 RIGHT-SIDED THORACIC BACK PAIN: Status: RESOLVED | Noted: 2021-02-08 | Resolved: 2021-09-09

## 2021-09-09 NOTE — PROGRESS NOTES
Pt seen in another episode since progress note dated 03/18/2021.  Consider note dated 03/18/2021 to serve as final summary for that episode.